# Patient Record
Sex: MALE | Race: WHITE | Employment: FULL TIME | URBAN - METROPOLITAN AREA
[De-identification: names, ages, dates, MRNs, and addresses within clinical notes are randomized per-mention and may not be internally consistent; named-entity substitution may affect disease eponyms.]

---

## 2020-07-08 ENCOUNTER — APPOINTMENT (OUTPATIENT)
Dept: GENERAL RADIOLOGY | Age: 25
End: 2020-07-08

## 2020-07-08 ENCOUNTER — HOSPITAL ENCOUNTER (EMERGENCY)
Age: 25
Discharge: HOME | End: 2020-07-08

## 2020-07-08 VITALS
HEART RATE: 97 BPM | TEMPERATURE: 98.5 F | HEIGHT: 69 IN | WEIGHT: 195 LBS | RESPIRATION RATE: 18 BRPM | DIASTOLIC BLOOD PRESSURE: 97 MMHG | SYSTOLIC BLOOD PRESSURE: 131 MMHG | BODY MASS INDEX: 28.88 KG/M2 | OXYGEN SATURATION: 98 %

## 2020-07-08 PROCEDURE — 73140 X-RAY EXAM OF FINGER(S): CPT

## 2020-07-08 PROCEDURE — 99283 EMERGENCY DEPT VISIT LOW MDM: CPT

## 2020-07-08 RX ORDER — OXYCODONE HYDROCHLORIDE AND ACETAMINOPHEN 5; 325 MG/1; MG/1
1-2 TABLET ORAL EVERY 6 HOURS PRN
Qty: 12 TABLET | Refills: 0 | Status: SHIPPED | OUTPATIENT
Start: 2020-07-08 | End: 2020-07-11

## 2020-07-08 RX ORDER — SULFAMETHOXAZOLE AND TRIMETHOPRIM 800; 160 MG/1; MG/1
1 TABLET ORAL 2 TIMES DAILY
COMMUNITY

## 2020-07-08 RX ORDER — CEPHALEXIN 500 MG/1
500 CAPSULE ORAL 4 TIMES DAILY
Qty: 28 CAPSULE | Refills: 0 | Status: SHIPPED | OUTPATIENT
Start: 2020-07-08 | End: 2020-07-15

## 2020-07-08 ASSESSMENT — PAIN SCALES - GENERAL: PAINLEVEL_OUTOF10: 5

## 2020-07-08 ASSESSMENT — PAIN DESCRIPTION - LOCATION: LOCATION: FINGER (COMMENT WHICH ONE)

## 2020-07-08 NOTE — ED NOTES
[address] [facility] @ [id number]  Re: Finger Infection  2nd call @ [phone]  3rd call @ [phone]  Dr Lombardo Ace responded @ [address]  07/08/20 [phone]

## 2020-07-10 ENCOUNTER — HOSPITAL ENCOUNTER (OUTPATIENT)
Age: 25
Discharge: HOME | End: 2020-07-10
Payer: OTHER GOVERNMENT

## 2020-07-10 ENCOUNTER — OFFICE VISIT (OUTPATIENT)
Dept: ORTHOPEDIC SURGERY | Age: 25
End: 2020-07-10
Payer: COMMERCIAL

## 2020-07-10 ENCOUNTER — HOSPITAL ENCOUNTER (OUTPATIENT)
Age: 25
Setting detail: OUTPATIENT SURGERY
Discharge: HOME | End: 2020-07-10
Attending: ORTHOPAEDIC SURGERY | Admitting: ORTHOPAEDIC SURGERY
Payer: OTHER GOVERNMENT

## 2020-07-10 VITALS — WEIGHT: 195 LBS | HEIGHT: 69 IN | BODY MASS INDEX: 28.88 KG/M2

## 2020-07-10 VITALS
WEIGHT: 195 LBS | OXYGEN SATURATION: 99 % | BODY MASS INDEX: 28.88 KG/M2 | HEART RATE: 69 BPM | DIASTOLIC BLOOD PRESSURE: 91 MMHG | TEMPERATURE: 98.5 F | HEIGHT: 69 IN | RESPIRATION RATE: 16 BRPM | SYSTOLIC BLOOD PRESSURE: 133 MMHG

## 2020-07-10 PROBLEM — L02.512 ABSCESS OF FINGER OF LEFT HAND: Status: ACTIVE | Noted: 2020-07-10

## 2020-07-10 LAB — SARS-COV-2, NAAT: NOT DETECTED

## 2020-07-10 PROCEDURE — 87075 CULTR BACTERIA EXCEPT BLOOD: CPT

## 2020-07-10 PROCEDURE — 3600000002 HC SURGERY LEVEL 2 BASE: Performed by: ORTHOPAEDIC SURGERY

## 2020-07-10 PROCEDURE — 87206 SMEAR FLUORESCENT/ACID STAI: CPT

## 2020-07-10 PROCEDURE — 87077 CULTURE AEROBIC IDENTIFY: CPT

## 2020-07-10 PROCEDURE — 7100000011 HC PHASE II RECOVERY - ADDTL 15 MIN: Performed by: ORTHOPAEDIC SURGERY

## 2020-07-10 PROCEDURE — 87015 SPECIMEN INFECT AGNT CONCNTJ: CPT

## 2020-07-10 PROCEDURE — 87205 SMEAR GRAM STAIN: CPT

## 2020-07-10 PROCEDURE — 3600000012 HC SURGERY LEVEL 2 ADDTL 15MIN: Performed by: ORTHOPAEDIC SURGERY

## 2020-07-10 PROCEDURE — 87186 SC STD MICRODIL/AGAR DIL: CPT

## 2020-07-10 PROCEDURE — 99203 OFFICE O/P NEW LOW 30 MIN: CPT | Performed by: ORTHOPAEDIC SURGERY

## 2020-07-10 PROCEDURE — 7100000010 HC PHASE II RECOVERY - FIRST 15 MIN: Performed by: ORTHOPAEDIC SURGERY

## 2020-07-10 PROCEDURE — 87070 CULTURE OTHR SPECIMN AEROBIC: CPT

## 2020-07-10 PROCEDURE — 87102 FUNGUS ISOLATION CULTURE: CPT

## 2020-07-10 PROCEDURE — 2500000003 HC RX 250 WO HCPCS: Performed by: ORTHOPAEDIC SURGERY

## 2020-07-10 PROCEDURE — 87116 MYCOBACTERIA CULTURE: CPT

## 2020-07-10 PROCEDURE — U0002 COVID-19 LAB TEST NON-CDC: HCPCS

## 2020-07-10 PROCEDURE — 2709999900 HC NON-CHARGEABLE SUPPLY: Performed by: ORTHOPAEDIC SURGERY

## 2020-07-10 RX ORDER — LIDOCAINE HYDROCHLORIDE 10 MG/ML
INJECTION, SOLUTION INFILTRATION; PERINEURAL
Status: DISCONTINUED
Start: 2020-07-10 | End: 2020-07-10 | Stop reason: HOSPADM

## 2020-07-10 RX ORDER — BUPIVACAINE HYDROCHLORIDE 2.5 MG/ML
INJECTION, SOLUTION EPIDURAL; INFILTRATION; INTRACAUDAL
Status: DISCONTINUED
Start: 2020-07-10 | End: 2020-07-10 | Stop reason: HOSPADM

## 2020-07-10 ASSESSMENT — PAIN SCALES - GENERAL: PAINLEVEL_OUTOF10: 0

## 2020-07-10 ASSESSMENT — PAIN - FUNCTIONAL ASSESSMENT
PAIN_FUNCTIONAL_ASSESSMENT: PREVENTS OR INTERFERES SOME ACTIVE ACTIVITIES AND ADLS
PAIN_FUNCTIONAL_ASSESSMENT: 0-10

## 2020-07-10 NOTE — PROGRESS NOTES
Pt arrived to [facility], local anesthesia only, no c/o pain and/or numbness or tingling, vitals stable, site unremarkable

## 2020-07-10 NOTE — PROGRESS NOTES
Department of Orthopedic Surgery  Attending   History and Physical        CHIEF COMPLAINT:  Left index pain    Reason for Admission: As Above    History Obtained From:  patient    HISTORY OF PRESENT ILLNESS:      The patient is a 22 y.o. male  who presents with left index finger pain and swelling over the past 5 days, he works in construction, specifically demolition, he is always having pokes and scrapes of the hands. Patient had a puncture on the dorsum of the left index finger PIP joint 5 or 6 days ago. Since that time he has had progressive swelling and stiffness of the index finger dorsally over the PIP joint. He has been on 2 different antibiotics without resolution. He denies fevers. He feels that his stiffness is worsening. No drainage. Past Medical History:    No past medical history on file. Past Surgical History:        Procedure Laterality Date    UPPER GASTROINTESTINAL ENDOSCOPY         Medications Prior to Admission:   Prior to Admission medications    Medication Sig Start Date End Date Taking? Authorizing Provider   sulfamethoxazole-trimethoprim (BACTRIM DS;SEPTRA DS) 800-160 MG per tablet Take 1 tablet by mouth 2 times daily    Historical Provider, MD   cephALEXin (KEFLEX) 500 MG capsule Take 1 capsule by mouth 4 times daily for 7 days 7/8/20 7/15/20  RAVI Zurita CNP   oxyCODONE-acetaminophen (PERCOCET) 5-325 MG per tablet Take 1-2 tablets by mouth every 6 hours as needed for Pain for up to 3 days. WARNING:  May cause drowsiness. May impair ability to operate vehicles or machinery. Do not use in combination with alcohol. 7/8/20 7/11/20  RAVI Zurita CNP       Allergies:  Patient has no known allergies. Social History:    Tobacco:  reports that he has been smoking. He has been smoking about 0.50 packs per day. He has never used smokeless tobacco.   Alcohol:  reports current alcohol use.    Illicit Drug: No    Family History:   No family history on file.    REVIEW OF SYSTEMS:  CONSTITUTIONAL:  negative  EYES:  negative  HEENT:  negative  RESPIRATORY:  negative  CARDIOVASCULAR:  negative  GASTROINTESTINAL:  negative  MUSCULOSKELETAL:  positive for  pain  NEUROLOGICAL:  positive for weakness    PHYSICAL EXAM:  Admission weight: 195 lb (88.5 kg)  5' 9\" (175.3 cm)  VITALS:  Ht 5' 9\" (1.753 m)   Wt 195 lb (88.5 kg)   BMI 28.80 kg/m²     awake, alert, cooperative, no apparent distress, and appears stated age  ENT:  Clear, eye movements intact  CHEST:  Clear, regular inspiration  CARDIAC: Reg rate  ABD:  Soft, NT  MUSCULOSKELETAL:  there is no redness, warmth, or swelling of the joints  full range of motion noted  motor strength is 5 out of 5 all extremities bilaterally  tone is normal  with exception of  left hand  Moderate swelling dorsally around the PIP joint, suspected purulence beneath the skin, a yellowish or whitish area of about 1 x 2 cm. Limited motion  NEURO:   weakness    DATA:  CBC: No results found for: WBC, RBC, HGB, HCT, MCV, MCH, MCHC, RDW, PLT, MPV  BMP:  No results found for: NA, K, CL, CO2, BUN, LABALBU, CREATININE, CALCIUM, GFRAA, LABGLOM, GLUCOSE  PT/INR:  No results found for: PROTIME, INR    Radiology:  No orders to display         ASSESSMENT: Left index finger abscess    PLAN:  1) to the operating room today for a formal I&D left index finger abscess, outpatient. Patient ate breakfast, therefore this will be done under local anesthesia  2) patient is in agreement. 3)  The risks and benefits were discussed thoroughly. These included, but were not limited to infection, tendon or nerve injury, scar or finger sensitivity, need for transfusion, adverse effects of anesthesia, incomplete relief of numbness, pain, and/or weakness. The patient was counseled at length about the risks of lorne Covid-19 during their perioperative period and any recovery window from their procedure.   The patient was made aware that lorne Covid-19 may worsen their prognosis for recovering from their procedure  and lend to a higher morbidity and/or mortality risk. All material risks, benefits, and reasonable alternatives including postponing the procedure were discussed. The patient does wish to proceed with the procedure at this time. All questions and concerns were addressed today. Patient is in agreement with the plan.         Oanh Hutson MD  Hand & Upper Extremity Surgery  [address] [facility])

## 2020-07-10 NOTE — OP NOTE
Operative Note      Patient: Gurjit Ghosh  YOB: 1995  MRN: [id number]    Date of Procedure: 7/10/2020    Pre-Op Diagnosis: ABSCESS OF LEFT INDEX FINGER    Post-Op Diagnosis: Same       Procedure(s):  INCISION AND DRAINAGE LEFT INDEX FINGER    Surgeon(s):  Amaris Quintero MD    Assistant:   * No surgical staff found *    Anesthesia: Local    Estimated Blood Loss (mL): Minimal    Complications: None    Specimens:   * No specimens in log *    Implants:  * No implants in log *      Drains: * No LDAs found *    Findings:     HPI:  52-year-old who presented to my office today for 5 to 6-day history of left index finger swelling and signs of infection after a puncture. He is been on Bactrim and now Keflex without resolution. He denies fevers and has not had drainage. Unfortunately the swelling and stiffness has worsened along with pain. Recommendation was made for surgical debridement the left index finger today. He was in agreement. He had already eaten breakfast before seeing me in office. Therefore we discussed a procedure under local anesthesia. Left index finger was marked in preop holding by the surgeon and confirmed by the patient. Informed consent was signed and on the chart. Risks and benefits outlined. Including those associated with current coronavirus pandemic    Detailed Description of Procedure:   Patient taken the operating room and kept in the usual supine position. Timeout held. Under sterile conditions a digital block was given to the left index finger without problem. Left upper extremity was preliminarily scrubbed, his hand was quite dirty, then prepped and draped in the normal sterile fashion. Repeat timeout held. Finger tourniquet used. Dorsal incision made over the left index finger through skin only, overlying the PIP joint. Extensive amount of purulence was encountered in the subcutaneous tissues. No obvious foreign body.   No rupture of the

## 2020-07-10 NOTE — H&P
I have reviewed the History & Physical and examined the patient and find no relevant changes. Done early today in office. Updated now for vitals. I have reviewed with the patient and/or family the risks, benefits, and alternatives to the procedure(s). All questions and concerns were addressed. Vitals:    07/10/20 1101   BP: (!) 143/76   Pulse: 86   Resp: 16   Temp: 98.5 °F (36.9 °C)   SpO2: 98%       Consent is on the chart. Surgical site, left index, has been marked by Dr Amanda Martinez and confirmed by the patient. All questions and concerns were addressed today. Patient is in agreement with the plan.         Armin Rowan MD  Hand & Upper Extremity Surgery  [address] [facility] partner of [facility] ([facility])

## 2020-07-13 ENCOUNTER — TELEPHONE (OUTPATIENT)
Dept: ORTHOPEDIC SURGERY | Age: 25
End: 2020-07-13

## 2020-07-14 ENCOUNTER — OFFICE VISIT (OUTPATIENT)
Dept: ORTHOPEDIC SURGERY | Age: 25
End: 2020-07-14

## 2020-07-14 VITALS — BODY MASS INDEX: 28.88 KG/M2 | WEIGHT: 195 LBS | HEIGHT: 69 IN

## 2020-07-14 PROCEDURE — 99024 POSTOP FOLLOW-UP VISIT: CPT | Performed by: ORTHOPAEDIC SURGERY

## 2020-07-14 RX ORDER — CLINDAMYCIN HYDROCHLORIDE 300 MG/1
300 CAPSULE ORAL 2 TIMES DAILY
Qty: 14 CAPSULE | Refills: 0 | Status: SHIPPED | OUTPATIENT
Start: 2020-07-14 | End: 2020-07-21

## 2020-07-14 NOTE — PROGRESS NOTES
Chief Complaint   Patient presents with    Post-Op Check     PO LEFT INDEX FINGER I&D SX:7/10/2020       HISTORY OF PRESENT ILLNESS:  Juan A Dennison is a 22 y.o.  patient here for postoperative evaluation after left index finger I&D for a severe infection, surgery was now 4 days ago. Patient still on antibiotics, he states 1 or 2 more days. He is on Keflex right now. Patient feels much improved in terms of pain. Denies numbness and fevers. He feels stiffness in the finger. ROS:  ROS neg     Past medical history is reviewed again today, no changes to report    PHYSICAL EXAMINATION:  Patient is alert and pleasant, in no acute distress. The affected extremity is examined today. Left index finger is healing nicely. No sign of dehiscence. Swelling is markedly improved. There has been some skin sloughing dorsally consistent with the severe infection. No residual fluctuance or drainage. No streaking erythema or lymphadenopathy. Finger is well aligned at rest.  No guarding with attempted motion. Stiffness is present, to be expected at this point. Fingers warm, sensate and viable    X-rays: None today    Cultures consistent with MSSA    IMPRESSION AND PLAN: Left index finger abscess, staph  Doing well. We will continue with our current care plan. Sutures to remain at this point to prevent dehiscence. Wound was cleansed today. Wound covered with Adaptic, gauze and Coban. Edema control and gentle motion is permitted. I would like him on 1 more week of antibiotic, switching to clindamycin after reviewing sensitivities. Follow-up in 2 weeks unless needed sooner. Likely suture removal then. Patient pleased with his early progress. All questions and concerns were addressed today. Patient is in agreement with the plan.         Melissa Valdovinos MD  Hand & Upper Extremity Surgery  [address]  A partner of [facility] ([facility])        Please note that this transcription was created using voice recognition software. Any errors are unintentional and may be due to voice recognition transcription.

## 2020-07-15 LAB
ANAEROBIC CULTURE: ABNORMAL
CULTURE SURGICAL: ABNORMAL
GRAM STAIN RESULT: ABNORMAL
ORGANISM: ABNORMAL

## 2020-07-24 ENCOUNTER — OFFICE VISIT (OUTPATIENT)
Dept: ORTHOPEDIC SURGERY | Age: 25
End: 2020-07-24

## 2020-07-24 VITALS — BODY MASS INDEX: 28.88 KG/M2 | HEIGHT: 69 IN | WEIGHT: 195 LBS

## 2020-07-24 PROCEDURE — 99213 OFFICE O/P EST LOW 20 MIN: CPT | Performed by: ORTHOPAEDIC SURGERY

## 2020-07-24 NOTE — PROGRESS NOTES
Chief Complaint   Patient presents with    Follow-up     PO LEFT INDEX FINGER I&D SX:7/10/2020       HISTORY OF PRESENT ILLNESS:  Rina Espino is a 22 y.o.  patient here for repeat postoperative evaluation after left index finger abscess drainage 12 days ago. He had a staph infection. Sensitive to clindamycin which ended 3 days ago. Patient feels he is doing quite well. Off antibiotics and denies drainage and fevers    ROS:  ROS neg     Past medical history is reviewed again today, no changes to report    PHYSICAL EXAMINATION:  Patient is alert and pleasant, in no acute distress. The affected extremity is examined today. Left index finger reveals well-healing wound. No evidence of infection or dehiscence. Minimal residual swelling, to be expected at this point. Good alignment at rest, no boutonniere. Mild stiffness with flexion but no guarding like before surgery    X-rays: None needed today    IMPRESSION AND PLAN: Left index finger infection, abscess  Doing well after drainage left index finger abscess. We will continue with our current care plan. Postoperative wound care was discussed with the patient today. Sutures were removed without difficulty. Steri-Strips were applied (as long as no allergy) to help prevent wound dehiscence. Appropriate monitoring and care of the wound, including cleansing, was outlined with the patient today. We discussed appropriate activity limitations and modifications over the next couple weeks to prevent wound complication, such as dehiscence. The patient understands to contact my office if having wound problems. These would include, but not limited to, erythema, new swelling or pain, dehiscence, signs of infection. Edema control and gentle motion. Protective activities until wound has fully healed over to prevent any dehiscence. Patient will follow-up as needed going forward, he is pleased with his progress.   All questions and concerns were addressed today. Patient is in agreement with the plan. Mable Saunders MD  Hand & Upper Extremity Surgery  [address]  A partner of [facility] ([facility])        Please note that this transcription was created using voice recognition software. Any errors are unintentional and may be due to voice recognition transcription.

## 2020-08-10 LAB
FUNGUS (MYCOLOGY) CULTURE: NORMAL
FUNGUS STAIN: NORMAL

## 2020-08-25 LAB
AFB CULTURE (MYCOBACTERIA): NORMAL
AFB SMEAR: NORMAL

## 2020-12-21 ENCOUNTER — HOSPITAL ENCOUNTER (EMERGENCY)
Age: 25
Discharge: HOME | End: 2020-12-21
Attending: EMERGENCY MEDICINE
Payer: COMMERCIAL

## 2020-12-21 VITALS
DIASTOLIC BLOOD PRESSURE: 92 MMHG | OXYGEN SATURATION: 100 % | WEIGHT: 207.3 LBS | BODY MASS INDEX: 30.7 KG/M2 | RESPIRATION RATE: 16 BRPM | TEMPERATURE: 98.8 F | HEIGHT: 69 IN | HEART RATE: 93 BPM | SYSTOLIC BLOOD PRESSURE: 147 MMHG

## 2020-12-21 LAB
A/G RATIO: 1.5 (ref 1.1–2.2)
ALBUMIN SERPL-MCNC: 4.5 G/DL (ref 3.4–5)
ALP BLD-CCNC: 87 U/L (ref 40–129)
ALT SERPL-CCNC: 29 U/L (ref 10–40)
ANION GAP SERPL CALCULATED.3IONS-SCNC: 11 MMOL/L (ref 3–16)
AST SERPL-CCNC: 31 U/L (ref 15–37)
BASOPHILS ABSOLUTE: 0.1 K/UL (ref 0–0.2)
BASOPHILS RELATIVE PERCENT: 0.7 %
BILIRUB SERPL-MCNC: <0.2 MG/DL (ref 0–1)
BUN BLDV-MCNC: 11 MG/DL (ref 7–20)
CALCIUM SERPL-MCNC: 9.2 MG/DL (ref 8.3–10.6)
CHLORIDE BLD-SCNC: 102 MMOL/L (ref 99–110)
CO2: 26 MMOL/L (ref 21–32)
CREAT SERPL-MCNC: 1 MG/DL (ref 0.9–1.3)
EOSINOPHILS ABSOLUTE: 0.1 K/UL (ref 0–0.6)
EOSINOPHILS RELATIVE PERCENT: 1.3 %
GFR AFRICAN AMERICAN: >60
GFR NON-AFRICAN AMERICAN: >60
GLOBULIN: 3 G/DL
GLUCOSE BLD-MCNC: 100 MG/DL (ref 70–99)
HCT VFR BLD CALC: 39 % (ref 40.5–52.5)
HEMOGLOBIN: 13.3 G/DL (ref 13.5–17.5)
LYMPHOCYTES ABSOLUTE: 1.9 K/UL (ref 1–5.1)
LYMPHOCYTES RELATIVE PERCENT: 19.6 %
MCH RBC QN AUTO: 27.7 PG (ref 26–34)
MCHC RBC AUTO-ENTMCNC: 34 G/DL (ref 31–36)
MCV RBC AUTO: 81.6 FL (ref 80–100)
MONOCYTES ABSOLUTE: 0.6 K/UL (ref 0–1.3)
MONOCYTES RELATIVE PERCENT: 6 %
NEUTROPHILS ABSOLUTE: 7 K/UL (ref 1.7–7.7)
NEUTROPHILS RELATIVE PERCENT: 72.4 %
PDW BLD-RTO: 13.2 % (ref 12.4–15.4)
PLATELET # BLD: 214 K/UL (ref 135–450)
PMV BLD AUTO: 7.7 FL (ref 5–10.5)
POTASSIUM REFLEX MAGNESIUM: 3.9 MMOL/L (ref 3.5–5.1)
RBC # BLD: 4.78 M/UL (ref 4.2–5.9)
SODIUM BLD-SCNC: 139 MMOL/L (ref 136–145)
TOTAL PROTEIN: 7.5 G/DL (ref 6.4–8.2)
WBC # BLD: 9.7 K/UL (ref 4–11)

## 2020-12-21 PROCEDURE — 36415 COLL VENOUS BLD VENIPUNCTURE: CPT

## 2020-12-21 PROCEDURE — 80053 COMPREHEN METABOLIC PANEL: CPT

## 2020-12-21 PROCEDURE — 99283 EMERGENCY DEPT VISIT LOW MDM: CPT

## 2020-12-21 PROCEDURE — 85025 COMPLETE CBC W/AUTO DIFF WBC: CPT

## 2020-12-21 NOTE — ED PROVIDER NOTES
[address] [facility]      Pt Name: Arie Jefferson  MRN: [id number]  [place] 1995  Date ofevaluation: 12/21/2020  Provider: Britany Mosqueda MD    CHIEF COMPLAINT       Chief Complaint   Patient presents with    Fatigue       HPI    HISTORY OF PRESENT ILLNESS   (Location/Symptom, Timing/Onset,Context/Setting, Quality, Duration, Modifying Factors, Severity)  Note limiting factors. Arie Jefferson is a 26 y.o. male who presents to the emergency department with fatigue. This is a 30-year-old male who presents with a feeling of fatigue. He describes some dizziness he describes as lightheadedness and fatigue that started while at his lunch break today at work. He denies any room spinning or vertiginous type of symptoms. He states he feels better currently. He denies any chest pain or abdominal pain. NursingNotes were reviewed. Review of Systems    REVIEW OF SYSTEMS    (2-9 systems for level 4, 10 or more for level 5)     Review of Systems   Constitutional: Negative for fever. Positive for fatigue. HENT: Negative for rhinorrhea and sore throat. Eyes: Negative for redness. Respiratory: Negative for shortness of breath. Cardiovascular: Negative for chest pain. Gastrointestinal: Negative for abdominal pain. Genitourinary: Negative for flank pain. Neurological: Negative for headaches. Hematological: Negative for adenopathy. Psychiatric/Behavioral: Negative for confusion. Except as noted above the remainder of the review of systems was reviewed and negative. PAST MEDICAL HISTORY   History reviewed. No pertinent past medical history.       SURGICALHISTORY       Past Surgical History:   Procedure Laterality Date    FINGER SURGERY Left     I&D    HAND SURGERY Left 7/10/2020    INCISION AND DRAINAGE LEFT INDEX FINGER performed by Annie Solares MD at [facility] CURRENT MEDICATIONS       Previous Medications    SULFAMETHOXAZOLE-TRIMETHOPRIM (BACTRIM DS;SEPTRA DS) 800-160 MG PER TABLET    Take 1 tablet by mouth 2 times daily       ALLERGIES     Patient has no known allergies. FAMILY HISTORY     History reviewed. No pertinent family history. SOCIAL HISTORY       Social History     Socioeconomic History    Marital status:      Spouse name: None    Number of children: None    Years of education: None    Highest education level: None   Occupational History    None   Social Needs    Financial resource strain: None    Food insecurity     Worry: None     Inability: None    Transportation needs     Medical: None     Non-medical: None   Tobacco Use    Smoking status: Current Every Day Smoker     Packs/day: 0.50    Smokeless tobacco: Never Used   Substance and Sexual Activity    Alcohol use: Yes     Comment: social    Drug use: Not Currently     Types: Cocaine, Marijuana, Methamphetamines, Opiates     Sexual activity: None   Lifestyle    Physical activity     Days per week: None     Minutes per session: None    Stress: None   Relationships    Social connections     Talks on phone: None     Gets together: None     Attends Yazidi service: None     Active member of club or organization: None     Attends meetings of clubs or organizations: None     Relationship status: None    Intimate partner violence     Fear of current or ex partner: None     Emotionally abused: None     Physically abused: None     Forced sexual activity: None   Other Topics Concern    None   Social History Narrative    None       SCREENINGS             PHYSICAL EXAM    (up to 7 for level 4, 8 or more for level 5)     ED Triage Vitals [12/21/20 1459]   BP Temp Temp Source Pulse Resp SpO2 Height Weight   (!) 147/92 98.8 °F (37.1 °C) Oral 93 16 100 % 5' 9\" (1.753 m) 207 lb 4.8 oz (94 kg)       Physical Exam:      General Appearance:  Alert, cooperative, appears stated age. Head:  Normocephalic, without obvious abnormality, atraumatic. Eyes:  conjunctiva/corneas clear, EOM's intact. Sclera anicteric. ENT:  Mucous remains are moist and pink   Neck: Supple, symmetrical, trachea midline, no adenopathy. No jugular venous distention. Lungs:    Clear to auscultation bilaterally   Chest Wall:   No pain to palpation   Heart:   Genitourinary:  Regular rate rhythm with no murmurs rubs gallops. Deferred   Abdomen:    Soft benign with no guarding or rebound. Extremities:  No clubbing cyanosis or edema   Pulses:  Good throughout   Skin:  No rashes or lesions to exposed skin. Neurologic: Alert and oriented X 3. DIAGNOSTIC RESULTS         RADIOLOGY:   Non-plain filmimages such as CT, Ultrasound and MRI are read by the radiologist. Plain radiographic images are visualized and preliminarily interpreted by the emergency physician with the below findings:    See below    Interpretation per the Radiologist below, if available at the time ofthis note: All incidental findings were discussed with the patient. No orders to display         ED BEDSIDE ULTRASOUND:   Performed by ED Physician - none    LABS:  Labs Reviewed   CBC WITH AUTO DIFFERENTIAL - Abnormal; Notable for the following components:       Result Value    Hemoglobin 13.3 (*)     Hematocrit 39.0 (*)     All other components within normal limits    Narrative:     Performed at:  [facility]  [address] [id number]  [facility]   Phone [phone]   COMPREHENSIVE METABOLIC PANEL W/ REFLEX TO MG FOR LOW K - Abnormal; Notable for the following components:    Glucose 100 (*)     All other components within normal limits    Narrative:     Performed at:  [facility]  [address]  [facility]   Phone [phone]       All other labs were within normal range or not returned as of this dictation.     EMERGENCY DEPARTMENT COURSE and DIFFERENTIAL DIAGNOSIS/MDM:   Vitals:    Vitals:    12/21/20 1459   BP: (!) 147/92   Pulse: 93   Resp: 16   Temp: 98.8 °F (37.1 °C)   TempSrc: Oral   SpO2: 100%   Weight: 207 lb 4.8 oz (94 kg)   Height: 5' 9\" (1.753 m)           MDM    The patient has remained stable throughout his hospital course. The patient's work-up was unremarkable including a CBC and a chemistry profile. On examination he has no neurological findings. This appears to be consistent with a probable viral illness. I see no evidence of an emergent process. He was discharged with referral back to his primary physician and instructed to return if worse. REASSESSMENT              CONSULTS:  None    PROCEDURES:  Unless otherwise noted below, none     Procedures    FINAL IMPRESSION      1. Fatigue, unspecified type          DISPOSITION/PLAN   DISPOSITION Decision To Discharge 12/21/2020 04:43:31 PM      PATIENT REFERREDTO:    Follow-up with your primary care physician when you get back to your hometown. Return if worse. Call in 1 day        DISCHARGEMEDICATIONS:  New Prescriptions    No medications on file     Controlled Substances Monitoring:     No flowsheet data found.     (Please note that portions of this note were completed with a voice recognition program.  Efforts were made to edit the dictations but occasionally words are mis-transcribed.)    Keyon Singleton MD (electronically signed)  Attending Emergency Physician          Keyon Singleton MD  12/21/20 9801

## 2020-12-21 NOTE — ED NOTES
Patient given  discharge instructions verbal and written, patient verbalized understanding. Alert/oriented X4, Clear speech.   Patient exhibits no distress, ambulates with steady gait per self leaving unit, no further request.     Harvey Liang RN  12/21/20 1521

## 2020-12-21 NOTE — ED NOTES
Patient to ed from urgent care with complaints of fatigue and feeling dizzy earlier today at work.      Anna Lara RN  12/21/20 2240

## (undated) DEVICE — SCRUB BETADINE SOLUTION 32 OZ

## (undated) DEVICE — GLOVE SURG SZ 7 L12IN FNGR THK94MIL STD WHT ISOLEX LTX FREE

## (undated) DEVICE — Device

## (undated) DEVICE — DISCONTINUED USE 394504 SYRINGE LUER LOCK TIP 12 ML

## (undated) DEVICE — Z CONVERTED USE 2273163 BANDAGE COMPR W3INXL4.5YD LTWT E EC SGL LAYERED CLP CLSR

## (undated) DEVICE — STERILE LATEX POWDER-FREE SURGICAL GLOVESWITH NITRILE COATING: Brand: PROTEXIS

## (undated) DEVICE — COTTON UNDERCAST PADDING,CRIMPED FINISH: Brand: WEBRIL

## (undated) DEVICE — NEEDLE HYPO 22GA L1.5IN BLK POLYPR HUB S STL REG BVL STR

## (undated) DEVICE — NEEDLE HYPO 18GA L1.5IN THN WALL PIVOTING SHLD BVL ORIENTED

## (undated) DEVICE — SPLINT ORTH W3XL12IN LAYERED FBRGLS FOAM PD BRTH BK MOLD